# Patient Record
Sex: MALE | Race: WHITE | NOT HISPANIC OR LATINO | Employment: UNEMPLOYED
[De-identification: names, ages, dates, MRNs, and addresses within clinical notes are randomized per-mention and may not be internally consistent; named-entity substitution may affect disease eponyms.]

---

## 2019-10-01 ENCOUNTER — HEALTH MAINTENANCE LETTER (OUTPATIENT)
Age: 44
End: 2019-10-01

## 2021-01-15 ENCOUNTER — HEALTH MAINTENANCE LETTER (OUTPATIENT)
Age: 46
End: 2021-01-15

## 2021-09-04 ENCOUNTER — HEALTH MAINTENANCE LETTER (OUTPATIENT)
Age: 46
End: 2021-09-04

## 2022-02-19 ENCOUNTER — HEALTH MAINTENANCE LETTER (OUTPATIENT)
Age: 47
End: 2022-02-19

## 2022-10-16 ENCOUNTER — HEALTH MAINTENANCE LETTER (OUTPATIENT)
Age: 47
End: 2022-10-16

## 2023-04-01 ENCOUNTER — HEALTH MAINTENANCE LETTER (OUTPATIENT)
Age: 48
End: 2023-04-01

## 2024-09-09 ENCOUNTER — OFFICE VISIT (OUTPATIENT)
Dept: URGENT CARE | Facility: URGENT CARE | Age: 49
End: 2024-09-09
Payer: COMMERCIAL

## 2024-09-09 VITALS
SYSTOLIC BLOOD PRESSURE: 105 MMHG | TEMPERATURE: 97.7 F | HEART RATE: 91 BPM | OXYGEN SATURATION: 99 % | WEIGHT: 155 LBS | DIASTOLIC BLOOD PRESSURE: 68 MMHG

## 2024-09-09 DIAGNOSIS — L03.211 FACIAL CELLULITIS: Primary | ICD-10-CM

## 2024-09-09 PROCEDURE — 99203 OFFICE O/P NEW LOW 30 MIN: CPT | Performed by: PHYSICIAN ASSISTANT

## 2024-09-09 RX ORDER — CEPHALEXIN 500 MG/1
500 CAPSULE ORAL 4 TIMES DAILY
Qty: 28 CAPSULE | Refills: 0 | Status: SHIPPED | OUTPATIENT
Start: 2024-09-09 | End: 2024-09-16

## 2024-09-09 ASSESSMENT — ENCOUNTER SYMPTOMS
TROUBLE SWALLOWING: 0
VOMITING: 0
FEVER: 0
RHINORRHEA: 0
SORE THROAT: 0
SINUS PAIN: 0
SINUS PRESSURE: 0
FACIAL SWELLING: 1

## 2024-09-09 NOTE — PROGRESS NOTES
Assessment & Plan:        ICD-10-CM    1. Facial cellulitis  L03.211 cephALEXin (KEFLEX) 500 MG capsule            Plan/Clinical Decision Making:    Patient with 4 days of jaw/chin pain with lower facial swelling. No injury or trauma. No fever or recent illness. Has erythema, tenderness, induration of chin and lower jaw.   Treat with warm compresses, ibuprofen, Tylenol.       Return if symptoms worsen or fail to improve, for in 2-3 days.     At the end of the encounter, I discussed results, diagnosis, medications. Discussed red flags for immediate return to clinic/ER, as well as indications for follow up if no improvement. Patient understood and agreed to plan. Patient was stable for discharge.        Mallory Sanchez PA-C on 9/9/2024 at 1:03 PM          Subjective:     HPI:    Otis is a 49 year old male who presents to clinic today for the following health issues:  Chief Complaint   Patient presents with    Urgent Care     Pain, swelling bilateral jaws Thursday-  unsure if related to teeth grinding from the nights before       HPI    Patient developed jaw swelling, lower jaw and chin area. Painful to touch.   Hx of grinding teeth at times. Unsure if that caused issues.   No bug bites. No known cut   Taking Tylenol or ibuprofen.   Using ice.     Review of Systems   Constitutional:  Negative for fever.   HENT:  Positive for facial swelling. Negative for congestion, dental problem, ear pain, mouth sores, rhinorrhea, sinus pressure, sinus pain, sore throat and trouble swallowing.    Gastrointestinal:  Negative for vomiting.         Patient Active Problem List   Diagnosis    Moderate major depression (H)    Generalized anxiety disorder    Alcohol abuse    CARDIOVASCULAR SCREENING; LDL GOAL LESS THAN 160    Seizure disorder (H)    Generalized convulsive epilepsy (H)        Past Medical History:   Diagnosis Date    ALCOHOL ABUSE-UNSPEC 9/12/2007    CARDIOVASCULAR SCREENING; LDL GOAL LESS THAN 160 5/9/2010    Moderate  major depression (H) 1/7/2005    Seizure disorder (H) 1/7/2013    Seizure, convulsion (H) 12/25/12    tonic/clonic, single, ? etoh related       Social History     Tobacco Use    Smoking status: Every Day     Current packs/day: 1.00     Average packs/day: 1 pack/day for 10.0 years (10.0 ttl pk-yrs)     Types: Cigarettes    Smokeless tobacco: Not on file    Tobacco comments:     2 cigs daily   Substance Use Topics    Alcohol use: Yes             Objective:     Vitals:    09/09/24 1258   BP: 105/68   Pulse: 91   Temp: 97.7  F (36.5  C)   TempSrc: Tympanic   SpO2: 99%   Weight: 70.3 kg (155 lb)         Physical Exam   EXAM:   Pleasant, alert, appropriate appearance. NAD.  Head Exam: Normocephalic, atraumatic.  Eye Exam:  PERRLA, EOMI, non icteric/injection.    Ear Exam: TMs grey without bulging. Normal canals.  Normal pinna.  Nose Exam: Normal external nose.    OroPharynx Exam:  Moist mucous membranes. No erythema, pharynx without exudate or hypertrophy.  Neck/Thyroid Exam:  No LAD. No nodules or masses.   Chest/Respiratory Exam: CTAB.  Cardiovascular Exam: RRR. No murmur or rubs.  Skin: swelling lower jaw and chin. Erythema with induration and tenderness of skin.       Results:  No results found for any visits on 09/09/24.

## 2024-10-19 ENCOUNTER — HEALTH MAINTENANCE LETTER (OUTPATIENT)
Age: 49
End: 2024-10-19